# Patient Record
Sex: FEMALE | Race: BLACK OR AFRICAN AMERICAN | URBAN - METROPOLITAN AREA
[De-identification: names, ages, dates, MRNs, and addresses within clinical notes are randomized per-mention and may not be internally consistent; named-entity substitution may affect disease eponyms.]

---

## 2018-01-12 ENCOUNTER — TRANSFERRED RECORDS (OUTPATIENT)
Dept: HEALTH INFORMATION MANAGEMENT | Facility: CLINIC | Age: 48
End: 2018-01-12

## 2018-01-15 ENCOUNTER — MEDICAL CORRESPONDENCE (OUTPATIENT)
Dept: HEALTH INFORMATION MANAGEMENT | Facility: CLINIC | Age: 48
End: 2018-01-15

## 2018-01-25 NOTE — TELEPHONE ENCOUNTER
APPT INFO     Date /Time:  01/29/18 12:30pm   Reason for Appt: New MARLEE   Ref Provider/Clinic: [facility]   Internal Records NA   External Records JS Has Notes   Records Requested?: YES   Done?: YES     ACTION  01/25/18 9:10 AM   What did you do? Called   Who? [facility]   Phone/Fax Number:  Phone:  [phone]     Fax:   Outcome? Spoke with Piedad in Medical Records, She is sending lab results            01/25/18 11:27 AM   Records Received From:  [facility]     Received Lab results

## 2018-01-29 ENCOUNTER — OFFICE VISIT (OUTPATIENT)
Dept: OPHTHALMOLOGY | Facility: CLINIC | Age: 48
End: 2018-01-29
Attending: OPHTHALMOLOGY
Payer: MEDICAID

## 2018-01-29 ENCOUNTER — PRE VISIT (OUTPATIENT)
Dept: OPHTHALMOLOGY | Facility: CLINIC | Age: 48
End: 2018-01-29

## 2018-01-29 DIAGNOSIS — H50.34 INTERMITTENT EXOTROPIA, ALTERNATING: Primary | ICD-10-CM

## 2018-01-29 DIAGNOSIS — E07.9 THYROID EYE DISEASE: ICD-10-CM

## 2018-01-29 DIAGNOSIS — H57.89 THYROID EYE DISEASE: ICD-10-CM

## 2018-01-29 DIAGNOSIS — H50.21 HYPOTROPIA OF RIGHT EYE: ICD-10-CM

## 2018-01-29 PROCEDURE — 92285 EXTERNAL OCULAR PHOTOGRAPHY: CPT | Mod: ZF | Performed by: OPHTHALMOLOGY

## 2018-01-29 PROCEDURE — 92060 SENSORIMOTOR EXAMINATION: CPT | Mod: ZF | Performed by: OPHTHALMOLOGY

## 2018-01-29 PROCEDURE — G0463 HOSPITAL OUTPT CLINIC VISIT: HCPCS | Mod: 25,ZF | Performed by: TECHNICIAN/TECHNOLOGIST

## 2018-01-29 RX ORDER — METHIMAZOLE 5 MG/1
15 TABLET ORAL
COMMUNITY
Start: 2017-05-12

## 2018-01-29 ASSESSMENT — LAGOPHTHALMOS
OS_LAGOPHTHALMOS: 0
OD_LAGOPHTHALMOS: 0

## 2018-01-29 ASSESSMENT — CUP TO DISC RATIO
OS_RATIO: 0.5
OD_RATIO: 0.5

## 2018-01-29 ASSESSMENT — REFRACTION_WEARINGRX
OD_SPHERE: -2.50
OD_ADD: +1.75
OS_SPHERE: -3.00
OS_AXIS: 170
OD_AXIS: 015
OS_CYLINDER: +2.75
OD_CYLINDER: +1.75
SPECS_TYPE: BIFOCAL
OS_ADD: +1.75

## 2018-01-29 ASSESSMENT — VISUAL ACUITY
OS_CC+: -2
CORRECTION_TYPE: GLASSES
OD_CC+: -3
METHOD: SNELLEN - LINEAR
OD_CC: 20/20
OS_CC: 20/20

## 2018-01-29 ASSESSMENT — EXTERNAL EXAM - LEFT EYE: OS_EXAM: PROPTOSIS

## 2018-01-29 ASSESSMENT — TONOMETRY
IOP_METHOD: B/S LM ICARE
OS_IOP_MMHG: 16
OD_IOP_MMHG: 16

## 2018-01-29 ASSESSMENT — EXTERNAL EXAM - RIGHT EYE: OD_EXAM: PROPTOSIS

## 2018-01-29 NOTE — PROGRESS NOTES
Ophthalmology Thyroid Clinic New Patient (ITEDS)    Patient: Kellee Meredith MRN# [id number]   YOB: 1970 Age: 47 year old   Date of Visit: Jan 29, 2018    Chief Complaint: Kellee Meredith is a 47 year old year old female who presents for evaluation of thyroid eye disease. Diagnosed in 2015 with Graves disease based on MCCARTHY uptake of 77%. Initially started on methimazole but stopped in 2016 2/2 side effects. Restarted January 2018.     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 Baldwin  Studies: TSH <0.02 , T4 2.4, free T3 12.9 (5/11/17)  Therapy: recently restartedTapazole 15mg    Current thyroid status: high Past Medical History:   Diagnosis Date     Graves disease          Current Outpatient Prescriptions:      methimazole (TAPAZOLE) 5 MG tablet, Take 15 mg by mouth, Disp: , Rfl:    Most bothersome orbitopathy symptoms: appearance Most bothersome systemic symptoms: Proptosis Overall quality of life relating to disease:  bothered     Examination:  Base Eye Exam     Visual Acuity (Snellen - Linear)      Right Left   Dist cc 20/20 -3 20/20 -2       Correction:  Glasses      Tonometry (B/S LM icare, 1:01 PM)      Right Left   Pressure 16 16         Pupils      Pupils APD   Right PERRL None   Left PERRL None         Neuro/Psych     Oriented x3:  Yes    Mood/Affect:  Normal            Additional Tests     Color      Right Left   Ishihara 11/11 11/11         Stereo      Fly:  +    Circles:  8/9            Strabismus Exam        Method:  Alternate cover Distance Near Near +3.00DS Near Bifocals     Correction:  cc       X' 15                   0 - trace 0  X 4 0 0 0    R Tilt                         X 4 - trace  0  X(T) 4 0  - trace  X 4             RHypo(T) 2       L Tilt       0 0 0  X(T) 4 0 0 0            DVD:    RHypo(T) 1 DVD:           Nystagmus:  None NPC:  3 cm Dist Near    AHP:  None Fusional Convergence:  suppresses       Fusional Divergence:        Refraction     Wearing Rx      Sphere Cylinder Axis Add   Right -2.50 +1.75 015 +1.75   Left -3.00 +2.75 170 +1.75       Age:  1yr    Type:  Bifocal                                   Disease Grade (ITEDS)   V (optic neuropathy) I (inflammation) S (diplopia) S (restriction) A (appearance/exposure)   Scoring N Caruncular edema: 0  Chemosis: 0  Conj redness: 0  Lid redness: 0  Lid edema:1  Retrobulbar ache: 1  Diurnal variation: 0       Intermittent: 2      Head tilt: n >45: 0  30-45: 1  15-30: 2  <15: 3   Lid stare: y  Light sensitivity: n  Bulging eyes: y  Tearing: y  Ocular irritation: y    Mild 1 (0-3)   Total 0/1 2/10 2/3 0/3 2/3   Progress  initial initial initial initial initial     Laboratory and Imaging Results:    TSI:     CT Scan: None  Visual field testing: None    Assessment/Plan:    Kellee Meredith is a 47 year old year old female who presents with Graves disease and THYROID EYE DISEASE.  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  I personally obtained the chief complaint(s) and history of present illness.  I confirmed and edited as necessary the review of systems, past medical/surgical history, family history, social history, and examination findings as documented by others; and I examined the patient myself.  I personally reviewed the relevant tests, images, and reports as documented above.  I formulated and edited as necessary the assessment and plan and discussed the findings and management plan with the patient and family. - Beau Good MD

## 2018-01-29 NOTE — MR AVS SNAPSHOT
After Visit Summary   1/29/2018    Kellee Meredith    MRN: [id number]           Patient Information     Date Of Birth          1970        Visit Information        Provider Department      1/29/2018 12:30 PM Beau Good MD [facility] Eye General        Today's Diagnoses     Intermittent exotropia, alternating    -  1    Hypotropia of right eye        Thyroid eye disease           Follow-ups after your visit        Follow-up notes from your care team     Return in about 4 months (around 5/29/2018) for Vision, tension color, thyroid eye clinic - Mondays.      Your next 10 appointments already scheduled     Jan 29, 2018  3:30 PM CST   CT TEMPORAL ORBITAL SELLA W/O CONTRAST with URCT1   [facility], [place], Radiology ([facility])    [facility] [address] [phone]           Please bring any scans or X-rays taken at other hospitals, if similar tests were done. Also bring a list of your medicines, including vitamins, minerals and over-the-counter drugs. It is safest to leave personal items at home.  Be sure to tell your doctor:   If you have any allergies.   If there s any chance you are pregnant.   If you are breastfeeding.   If you have any special needs.  You do not need to do anything special to prepare.  Please wear loose clothing, such as a sweat suit or jogging clothes. Avoid snaps, zippers and other metal. We may ask you to undress and put on a hospital gown.            May 21, 2018  2:30 PM CDT   Return Pediatric Visit with Beau Good MD   [facility] Peds Eye General ([facility])    [address] [facility] [address]  [address] [address] [phone]              Future tests that were ordered for you today     Open Future Orders        Priority Expected Expires Ordered    Thyroid stimulating immunoglobulin Routine  4/29/2018 1/29/2018    CT Temporal Orbital Sella w/o Contrast  Routine  2019    Thyroid stimulating immunoglobulin Routine  2018            Who to contact     Please call your clinic at [phone] to:    Ask questions about your health    Make or cancel appointments    Discuss your medicines    Learn about your test results    Speak to your doctor   If you have compliments or concerns about an experience at your clinic, or if you wish to file a complaint, please contact [facility] Patient Relations at [phone] or email us at [email or web address]         Additional Information About Your Visit        [email or web address] Information     Neotropix is an electronic gateway that provides easy, online access to your medical records. With Neotropix, you can request a clinic appointment, read your test results, renew a prescription or communicate with your care team.     To sign up for Neotropix visit the website at [email or web address]   You will be asked to enter the access code listed below, as well as some personal information. Please follow the directions to create your username and password.     Your access code is: [email or web address]  Expires: 2018  1:57 PM     Your access code will  in 90 days. If you need help or a new code, please contact your [facility] or call [phone] for assistance.        Care EveryWhere ID     This is your Care EveryWhere ID. This could be used by other organizations to access your [facility] medical records  [id number]         Blood Pressure from Last 3 Encounters:   No data found for BP    Weight from Last 3 Encounters:   No data found for Wt              We Performed the Following     External Photos Thyroid Series     Sensorimotor        Primary Care Provider Office Phone # Fax #    James Covington [phone]       [address] [address]        Equal Access to Services     KEY LUX AH: Sivakumar velázquez  mitchell Ellis, warussellda terryadaha, qadanitata kageorgina li, mini akankshain hayaavirginia coppolatitus maruiceaubrie laelenivirginia lizbeth. So [facility] [phone].    ATENCIÓN: Si habla español, tiene a parikh disposición servicios gratuitos de asistencia lingüística. Llame al [phone].    We comply with applicable federal civil rights laws and [place] laws. We do not discriminate on the basis of race, color, national origin, age, disability, sex, sexual orientation, or gender identity.            Thank you!     Thank you for choosing [facility]  for your care. Our goal is always to provide you with excellent care. Hearing back from our patients is one way we can continue to improve our services. Please take a few minutes to complete the written survey that you may receive in the mail after your visit with us. Thank you!             Your Updated Medication List - Protect others around you: Learn how to safely use, store and throw away your medicines at www.disposemymeds.org.          This list is accurate as of 1/29/18  2:10 PM.  Always use your most recent med list.                   Brand Name Dispense Instructions for use Diagnosis    methimazole 5 MG tablet    TAPAZOLE     Take 15 mg by mouth

## 2018-01-29 NOTE — LETTER
1/29/2018      RE: Kellee Meredith  [address] [facility] [address]       Ophthalmology Thyroid Clinic New Patient (ITEDS)    Patient: Kellee Meredith MRN# [id number]   YOB: 1970 Age: 47 year old   Date of Visit: Jan 29, 2018    Chief Complaint: Kellee Meredith is a 47 year old year old female who presents for evaluation of thyroid eye disease. Diagnosed in 2015 with Graves disease based on MCCARTHY uptake of 77%. Initially started on methimazole but stopped in 2016 2/2 side effects. Restarted January 2018.     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 Baldwin  Studies: TSH <0.02 , T4 2.4, free T3 12.9 (5/11/17)  Therapy: recently restartedTapazole 15mg    Current thyroid status: high Past Medical History:   Diagnosis Date     Graves disease          Current Outpatient Prescriptions:      methimazole (TAPAZOLE) 5 MG tablet, Take 15 mg by mouth, Disp: , Rfl:    Most bothersome orbitopathy symptoms: appearance Most bothersome systemic symptoms: Proptosis Overall quality of life relating to disease:  bothered     Examination:  Base Eye Exam     Visual Acuity (Snellen - Linear)      Right Left   Dist cc 20/20 -3 20/20 -2       Correction:  Glasses      Tonometry (B/S LM icare, 1:01 PM)      Right Left   Pressure 16 16         Pupils      Pupils APD   Right PERRL None   Left PERRL None         Neuro/Psych     Oriented x3:  Yes    Mood/Affect:  Normal             Additional Tests     Color      Right Left   Kvein 11/11 11/11         Stereo     Fly:  +    Circles:  8/9            Strabismus Exam        Method:  Alternate cover Distance Near Near +3.00DS Near Bifocals     Correction:  cc       X' 15                   0 - trace 0  X 4 0 0 0    R Tilt                         X 4 - trace  0  X(T) 4 0  - trace  X 4             RHypo(T) 2       L Tilt       0 0 0  X(T) 4 0 0 0            DVD:    RHypo(T) 1 DVD:           Nystagmus:  None NPC:  3 cm Dist Near    AHP:  None Fusional Convergence:  suppresses       Fusional Divergence:        Refraction     Wearing Rx      Sphere Cylinder Axis Add   Right -2.50 +1.75 015 +1.75   Left -3.00 +2.75 170 +1.75       Age:  1yr    Type:  Bifocal                Disease Grade (ITEDS)   V (optic neuropathy) I (inflammation) S (diplopia) S (restriction) A (appearance/exposure)   Scoring N Caruncular edema: 0  Chemosis: 0  Conj redness: 0  Lid redness: 0  Lid edema:1  Retrobulbar ache: 1  Diurnal variation: 0       Intermittent: 2      Head tilt: n >45: 0  30-45: 1  15-30: 2  <15: 3   Lid stare: y  Light sensitivity: n  Bulging eyes: y  Tearing: y  Ocular irritation: y    Mild 1 (0-3)   Total 0/1 2/10 2/3 0/3 2/3   Progress  initial initial initial initial initial       Laboratory and Imaging Results:    TSI:   CT Scan: None  Visual field testing: None    Assessment/Plan:    Kellee Meredith is a 47 year old year old female who presents with Graves disease and THYROID EYE DISEASE.  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  I would like to thank you for sending your records and I have summarized them in the history of present illness.  My assessment and plan are below.  For further details, please see my attached clinic note.     Beau Good MD  Professor, Neuro-Ophthalmology  Department of Ophthalmology and Visual Neurosciences  [facility]

## 2018-01-29 NOTE — NURSING NOTE
Chief Complaint   Patient presents with     Thyroid Disease     dx with Graves disease in 2015, currently taking methimozole, VA seems good cc - has become more dependent on glasses, eyes appear to bulge,occaisonal eye pain, increased dark circles around eyes, some tearing noticed, no eye redness, +itchy eyes, HA for past two days left temple area, rarely noticed diplopia, no strab noticed     HPI    Informant(s):  patient    Affected eye(s):  Both   Symptoms:

## 2018-01-30 ENCOUNTER — HOSPITAL ENCOUNTER (OUTPATIENT)
Dept: CT IMAGING | Facility: CLINIC | Age: 48
Discharge: HOME | End: 2018-01-30
Attending: OPHTHALMOLOGY | Admitting: OPHTHALMOLOGY
Payer: MEDICAID

## 2018-01-30 DIAGNOSIS — E07.9 THYROID EYE DISEASE: ICD-10-CM

## 2018-01-30 DIAGNOSIS — H57.89 THYROID EYE DISEASE: ICD-10-CM

## 2018-01-30 DIAGNOSIS — H50.34 INTERMITTENT EXOTROPIA, ALTERNATING: ICD-10-CM

## 2018-01-30 DIAGNOSIS — H50.21 HYPOTROPIA OF RIGHT EYE: ICD-10-CM

## 2018-01-30 PROCEDURE — 84445 ASSAY OF TSI GLOBULIN: CPT | Performed by: OPHTHALMOLOGY

## 2018-01-30 PROCEDURE — 70480 CT ORBIT/EAR/FOSSA W/O DYE: CPT

## 2018-02-01 LAB — TSI SER-ACNC: 6.1 TSI INDEX

## 2018-05-21 ENCOUNTER — OFFICE VISIT (OUTPATIENT)
Dept: OPHTHALMOLOGY | Facility: CLINIC | Age: 48
End: 2018-05-21
Attending: OPHTHALMOLOGY

## 2018-05-21 DIAGNOSIS — E07.9 THYROID EYE DISEASE: ICD-10-CM

## 2018-05-21 DIAGNOSIS — H57.89 THYROID EYE DISEASE: ICD-10-CM

## 2018-05-21 DIAGNOSIS — H50.52 EXOPHORIA: Primary | ICD-10-CM

## 2018-05-21 PROCEDURE — 92060 SENSORIMOTOR EXAMINATION: CPT | Mod: ZF | Performed by: OPHTHALMOLOGY

## 2018-05-21 ASSESSMENT — EXTERNAL EXAM - LEFT EYE: OS_EXAM: PROPTOSIS

## 2018-05-21 ASSESSMENT — TONOMETRY
OD_IOP_MMHG: 22
OS_IOP_MMHG: 28
IOP_METHOD: ICARE - MM/KS
OS_IOP_MMHG: 13
OD_IOP_MMHG: 12

## 2018-05-21 ASSESSMENT — REFRACTION_WEARINGRX
OS_CYLINDER: +2.75
OD_SPHERE: -2.50
OD_ADD: +1.75
OS_ADD: +1.75
OD_AXIS: 015
OD_CYLINDER: +1.75
OS_AXIS: 170
SPECS_TYPE: BIFOCAL
OS_SPHERE: -3.00

## 2018-05-21 ASSESSMENT — SLIT LAMP EXAM - LIDS: COMMENTS: NORMAL

## 2018-05-21 ASSESSMENT — CONF VISUAL FIELD
OD_NORMAL: 1
METHOD: COUNTING FINGERS
OS_NORMAL: 1

## 2018-05-21 ASSESSMENT — CUP TO DISC RATIO
OS_RATIO: 0.5
OD_RATIO: 0.5

## 2018-05-21 ASSESSMENT — VISUAL ACUITY
OD_CC: 20/20
CORRECTION_TYPE: GLASSES
METHOD: SNELLEN - LINEAR
OS_CC: 20/20
OD_CC+: -

## 2018-05-21 ASSESSMENT — EXTERNAL EXAM - RIGHT EYE: OD_EXAM: PROPTOSIS

## 2018-05-21 NOTE — PROGRESS NOTES
Ophthalmology Thyroid Clinic Return Visit (ITEDS)    Patient: Kellee Meredith MRN# [id number]   YOB: 1970 Age: 47 year old   Date of Visit: May 21, 2018    Chief Complaint: Kellee Meredith is a 47 year old year old female who presents for follow up of thyroid eye disease.    Orbitopathy Thyroid General   Symptoms: Tearing, intermittent diplopia Symptoms: Fatigue Smoking: Never smoker   Progress: Both have improved Progress: Stable/improved Med changes: Methimazole increase   Therapy: Tears Therapy: Methimazole 20 mg (increased in Feb 2018) Quality of life: Good       Examination:    Base Eye Exam     Visual Acuity (Snellen - Linear)      Right Left   Dist cc 20/20 - 20/20       Correction:  Glasses      Tonometry (icare - MM/ks, 3:01 PM)      Right Left   Pressure 12 13         Tonometry #2 (icare - upgaze/TT/ks, 3:01 PM)      Right Left   Pressure 22 28         Pupils      Pupils APD   Right PERRL None   Left PERRL None         Visual Fields (Counting fingers)      Left Right   Result Full Full         Neuro/Psych     Oriented x3:  Yes    Mood/Affect:  Normal            Additional Tests     Color      Right Left   Ishihara 11/11 11/11         Stereo     Fly:  +    Animals:  3/3    Circles:  9/9      Double Ortiz Jose Carlos     Right:  Normal    Left:  Normal            Strabismus Exam        Method:  Alternate cover Distance Near Near +3.00DS Near Bifocals     Correction:  cc       X' 15-20                   0 -tr 0  X flick 0 -tr 0    R Tilt                         X flick 0  0  X 4 0  0  X flick                     L Tilt       0 0 0  X 4 0 0 0            DVD:      DVD:           Nystagmus:  None NPC:  To the nose Dist Near    AHP:  None Fusional Convergence:  35     Fusional Divergence:         No diplopia d/n, I do not see vertical strabismus.   Refraction     Wearing Rx      Sphere Cylinder Axis Add   Right -2.50 +1.75 015 +1.75   Left -3.00 +2.75 170 +1.75       Type:  Bifocal                 Disease Grade (ITEDS)    V (optic neuropathy) I (inflammation) S (diplopia) S (restriction) A (appearance/exposure)   Scoring N Caruncular edema: 0  Chemosis: 0  Conj redness: 0  Lid redness: 0  Lid edema:0  Retrobulbar ache: 1  Diurnal variation: 0       Intermittent: 2        Head tilt: n >45: 0  30-45: 1  15-30: 2  <15: 3 Lid stare: y  Light sensitivity: n  Bulging eyes: y  Tearing: y  Ocular irritation: y     Mild 1 (0-3)   Total 0/1 0/10 2/3 0/3 2/3   Progress  stable improved stable stable stable      Laboratory and Imaging Results:  No results found for: TSH]  No results found for: T4]    TSI: 6.1 1/30/2018    CT Scan:   Findings:  Mild bilateral proptosis.     Bilateral thickening of the orbital musculature with decreased central  density consistent with fatty infiltration. This is most pronounced in  the inferior rectus muscles.     Partial visualization of the superior pole of the right thyroid, which  appears homogeneous. Minimal mucosal thickening along the floor the  left maxillary sinus. Mastoid air cells are clear. Benign frontal  hyperostosis. No acute intracranial pathology.         Impression:   Thyroid associated orbitopathy including proptosis and bilateral  thickening and fatty infiltration of the orbital muscles.     I have personally reviewed the examination and initial interpretation  and I agree with the findings.      Assessment/Plan:    Kellee Meredith is a 47 year old year old female with thyroid eye disease here for follow up.     - overall, her linda exophthalmometry is worse both eyes  - her eyes are red today, which is new   - Maintain good follow up and continue thyroid medication as recommended by endocrinologist  - Continue artifical tears  - Start Selenium    Overall disease activity: Active    Return visit: 4-6 months or sooner if needed      Attending Physician Attestation:  Complete documentation of historical and exam elements from today's encounter can be found  in the full encounter summary report (not reduplicated in this progress note).  I personally obtained the chief complaint(s) and history of present illness.  I confirmed and edited as necessary the review of systems, past medical/surgical history, family history, social history, and examination findings as documented by others; and I examined the patient myself.  I personally reviewed the relevant tests, images, and reports as documented above.  I formulated and edited as necessary the assessment and plan and discussed the findings and management plan with the patient and family. - Beau Good MD

## 2018-05-21 NOTE — MR AVS SNAPSHOT
After Visit Summary   2018    Kellee Meredith    MRN: [id number]           Patient Information     Date Of Birth          1970        Visit Information        Provider Department      2018 2:30 PM Beau Good MD [facility] Peds Eye General        Today's Diagnoses     Exophoria    -  1    Thyroid eye disease           Follow-ups after your visit        Follow-up notes from your care team     Return in about 6 months (around 2018) for Vision, tension color, thyroid eye clinic - .      Your next 10 appointments already scheduled     2018  2:30 PM CST   RETURN THYROID EYE with Beau Good MD   [facility] Peds Eye General ([facility])    [address] [facility] [address]  [address] [address] [phone]              Who to contact     Please call your clinic at [phone] to:    Ask questions about your health    Make or cancel appointments    Discuss your medicines    Learn about your test results    Speak to your doctor            Additional Information About Your Visit        MyChart Information     Kaseya is an electronic gateway that provides easy, online access to your medical records. With Kaseya, you can request a clinic appointment, read your test results, renew a prescription or communicate with your care team.     To sign up for Kaseya visit the website at [email or web address]   You will be asked to enter the access code listed below, as well as some personal information. Please follow the directions to create your username and password.     Your access code is: [email or web address]  Expires: 2018  3:34 PM     Your access code will  in 90 days. If you need help or a new code, please contact your [facility] or call [phone] for assistance.        Care EveryWhere ID     This is your Care EveryWhere ID. This could be used by other organizations to access your [facility]  records  [id number]         Blood Pressure from Last 3 Encounters:   No data found for BP    Weight from Last 3 Encounters:   No data found for Wt              We Performed the Following     Sensorimotor        Primary Care Provider Office Phone # Fax #    James Covington [phone]       [facility] [place] [address] [facility] [address]        Equal Access to Services     [facility] MACI : Hadii aad ku hadasho Soomaali, waaxda luqadaha, qaybta kaalmada adeegyada, waxay idiin hayaan adeeg kharash la'aan . So [facility] [phone].    ATENCIÓN: Si habla español, tiene a parikh disposición servicios gratuitos de asistencia lingüística. Llame al [phone].    We comply with applicable federal civil rights laws and [place] laws. We do not discriminate on the basis of race, color, national origin, age, disability, sex, sexual orientation, or gender identity.            Thank you!     Thank you for choosing [facility] EYE GENERAL  for your care. Our goal is always to provide you with excellent care. Hearing back from our patients is one way we can continue to improve our services. Please take a few minutes to complete the written survey that you may receive in the mail after your visit with us. Thank you!             Your Updated Medication List - Protect others around you: Learn how to safely use, store and throw away your medicines at www.disposemymeds.org.          This list is accurate as of 5/21/18  3:39 PM.  Always use your most recent med list.                   Brand Name Dispense Instructions for use Diagnosis    methimazole 5 MG tablet    TAPAZOLE     Take 15 mg by mouth

## 2018-05-24 ENCOUNTER — HOSPITAL ENCOUNTER (EMERGENCY)
Facility: CLINIC | Age: 48
Discharge: HOME | End: 2018-05-25
Attending: EMERGENCY MEDICINE | Admitting: EMERGENCY MEDICINE

## 2018-05-24 DIAGNOSIS — R10.13 EPIGASTRIC PAIN: ICD-10-CM

## 2018-05-24 NOTE — ED AVS SNAPSHOT
[facility] Emergency Department    [address] [place] [address]    Phone:  [phone]    Fax:  [phone]                                       Kellee Meredith   MRN: [id number]    Department:  [facility] Emergency Department   Date of Visit:  5/24/2018           Patient Information     Date Of Birth          1970        Your diagnoses for this visit were:     Epigastric pain        You were seen by Joe Jones MD.      Follow-up Information     Follow up with James Covington NP. Call in 1 week.    Specialty:  Nurse Practitioner    Contact information:    [address] [id number] [phone]          Discharge Instructions       You have a small right renal cyst. Please follow up with your doctor on this     Discharge Instructions  Abdominal Pain    Abdominal pain can be caused by many things. Your evaluation today does not show the exact cause for your pain. Your doctor today has decided that it is unlikely your pain is due to a life threatening problem, or a problem requiring surgery or hospital admission. Sometimes those problems cannot be found right away, so it is very important that you follow up as directed.  Sometimes only the changes which occur over time allow the cause of your pain to be found.    Return to the Emergency Department for a recheck in 8-12 hours if your pain continues.  If your pain gets worse, changes in location, or feels different, return to the Emergency Department right away.    ADULTS:  Return to the Emergency Department right away if:      You get an oral temperature above 102oF or as directed by your doctor.    You have blood in your stools (bright red or black, tarry stools).    You keep throwing up or can t drink liquids.    You see blood when you throw up.    You can t have a bowel movement or you can t pass gas.    Your stomach gets bloated or bigger.    Your skin or the whites  of your eyes look yellow.    You faint.    You have bloody, frequent or painful urination.    You have new symptoms or anything that worries you.    CHILDREN:  Return to the Emergency Department right away if your child has any of the above-listed symptoms or the following:      Pushes your hand away or screams/cries when his/her belly is touched.    You notice your child is very fussy or weak.    Your child is very tired and is too tired to eat or drink.    Your child is dehydrated.  Signs of dehydration can be:  o Your infant has had no wet diapers in 4-5 hours.  o Your older child has not passed urine in 6-8 hours.  o Your infant or child starts to have dry mouth and lips, or no saliva or tears.    PREGNANT WOMEN:  Return to the Emergency Department right away if you have any of the above-listed symptoms or the following:      You have bleeding, leaking fluid or passing tissue from the vagina.    You have worse pain or cramping, or pain in your shoulder or back.    You have vomiting that will not stop.    You have painful or bloody urination.    You have a temperature of 100oF or more.    Your baby is not moving as much as usual.    You faint.    You get a bad headache with or without eye problems and abdominal pain.    You have a convulsion or seizure.    You have unusual discharge from your vagina and abdominal pain.    Abdominal pain is pretty common during pregnancy.  Your pain may or may not be related to your pregnancy. You should follow-up closely with your OB doctor so they can evaluate you and your baby.  Until you follow-up with your regular doctor, do the following:       Avoid sex and do not put anything in your vagina.    Drink clear fluids.    Only take medications approved by your doctor.    MORE INFORMATION:    Appendicitis:  A possible cause of abdominal pain in any person who still has their appendix is acute appendicitis. Appendicitis is often hard to diagnose.  Testing does not always rule out  "early appendicitis or other causes of abdominal pain. Close follow-up with your doctor and re-evaluations may be needed to figure out the reason for your abdominal pain.    Follow-up:  It is very important that you make an appointment with your clinic and go to the appointment.  If you do not follow-up with your primary doctor, it may result in missing an important development which could result in permanent injury or disability and/or lasting pain.  If there is any problem keeping your appointment, call your doctor or return to the Emergency Department.    Medications:  Take your medications as directed by your doctor today.  Before using over-the-counter medications, ask your doctor and make sure to take the medications as directed.  If you have any questions about medications, ask your doctor.    Diet:  Resume your normal diet as much as possible, but do not eat fried, fatty or spicy foods while you have pain.  Do not drink alcohol or have caffeine.  Do not smoke tobacco.    Probiotics: If you have been given an antibiotic, you may want to also take a probiotic pill or eat yogurt with live cultures. Probiotics have \"good bacteria\" to help your intestines stay healthy. Studies have shown that probiotics help prevent diarrhea and other intestine problems (including C. diff infection) when you take antibiotics. You can buy these without a prescription in the pharmacy section of the store.     If you were given a prescription for medicine here today, be sure to read all of the information (including the package insert) that comes with your prescription.  This will include important information about the medicine, its side effects, and any warnings that you need to know about.  The pharmacist who fills the prescription can provide more information and answer questions you may have about the medicine.  If you have questions or concerns that the pharmacist cannot address, please call or return to the Emergency " Department.         Opioid Medication Information    Pain medications are among the most commonly prescribed medicines, so we are including this information for all our patients. If you did not receive pain medication or get a prescription for pain medicine, you can ignore it.     You may have been given a prescription for an opioid (narcotic) pain medicine and/or have received a pain medicine while here in the Emergency Department. These medicines can make you drowsy or impaired. You must not drive, operate dangerous equipment, or engage in any other dangerous activities while taking these medications. If you drive while taking these medications, you could be arrested for DUI, or driving under the influence. Do not drink any alcohol while you are taking these medications.     Opioid pain medications can cause addiction. If you have a history of chemical dependency of any type, you are at a higher risk of becoming addicted to pain medications.  Only take these prescribed medications to treat your pain when all other options have been tried. Take it for as short a time and as few doses as possible. Store your pain pills in a secure place, as they are frequently stolen and provide a dangerous opportunity for children or visitors in your house to start abusing these powerful medications. We will not replace any lost or stolen medicine.  As soon as your pain is better, you should flush all your remaining medication.     Many prescription pain medications contain Tylenol  (acetaminophen), including Vicodin , Tylenol #3 , Norco , Lortab , and Percocet .  You should not take any extra pills of Tylenol  if you are using these prescription medications or you can get very sick.  Do not ever take more than 3000 mg of acetaminophen in any 24 hour period.    All opioids tend to cause constipation. Drink plenty of water and eat foods that have a lot of fiber, such as fruits, vegetables, prune juice, apple juice and high fiber  cereal.  Take a laxative if you don t move your bowels at least every other day. Miralax , Milk of Magnesia, Colace , or Senna  can be used to keep you regular.      Remember that you can always come back to the Emergency Department if you are not able to see your regular doctor in the amount of time listed above, if you get any new symptoms, or if there is anything that worries you.          Your next 10 appointments already scheduled     Nov 19, 2018  2:30 PM CST   RETURN THYROID EYE with Beau Good MD   [facility] ([facility])    [address] [facility] [address]  [address] [address] [phone]              24 Hour Appointment Hotline       To make an appointment at any [facility] ([facility]), call [phone] ([phone]). If you don't have a family doctor or clinic, we will help you find one. [facility] are conveniently located to serve the needs of you and your family.             Review of your medicines      START taking        Dose / Directions Last dose taken    ondansetron 4 MG ODT tab   Commonly known as:  ZOFRAN ODT   Dose:  4 mg   Quantity:  10 tablet        Take 1 tablet (4 mg) by mouth every 8 hours as needed for nausea   Refills:  0        ranitidine 150 MG tablet   Commonly known as:  ZANTAC   Dose:  150 mg   Quantity:  30 tablet        Take 1 tablet (150 mg) by mouth 2 times daily for 15 days   Refills:  0          Our records show that you are taking the medicines listed below. If these are incorrect, please call your family doctor or clinic.        Dose / Directions Last dose taken    methimazole 5 MG tablet   Commonly known as:  TAPAZOLE   Dose:  15 mg        Take 15 mg by mouth   Refills:  0                Prescriptions were sent or printed at these locations (2 Prescriptions)                   Other Prescriptions                Printed at Department/Unit printer (2 of 2)         ondansetron (ZOFRAN ODT) 4 MG ODT tab               ranitidine  (ZANTAC) 150 MG tablet                Procedures and tests performed during your visit     Abdomen US, limited (RUQ only)    CBC with platelets differential    Comprehensive metabolic panel    ISTAT HCG Quantitative Pregnancy Nursing POCT    ISTAT HCG Quantitative Pregnancy POCT    Lipase    UA with Microscopic      Orders Needing Specimen Collection     None      Pending Results     Date and Time Order Name Status Description    5/25/2018 0105 Abdomen US, limited (RUQ only) Preliminary             Pending Culture Results     No orders found for last 3 day(s).            Pending Results Instructions     If you had any lab results that were not finalized at the time of your Discharge, you can call the ED Lab Result RN at [phone]. You will be contacted by this team for any positive Lab results or changes in treatment. The nurses are available 7 days a week from 10A to 6:30P.  You can leave a message 24 hours per day and they will return your call.        Test Results From Your Hospital Stay        5/25/2018 12:44 AM      Component Results     Component Value Ref Range & Units Status    WBC 8.5 4.0 - 11.0 10e9/L Final    RBC Count 5.28 (H) 3.8 - 5.2 10e12/L Final    Hemoglobin 12.2 11.7 - 15.7 g/dL Final    Hematocrit 39.3 35.0 - 47.0 % Final    MCV 74 (L) 78 - 100 fl Final    MCH 23.1 (L) 26.5 - 33.0 pg Final    MCHC 31.0 (L) 31.5 - 36.5 g/dL Final    RDW 14.1 10.0 - 15.0 % Final    Platelet Count 193 150 - 450 10e9/L Final    Diff Method Automated Method  Final    % Neutrophils 35.3 % Final    % Lymphocytes 53.7 % Final    % Monocytes 9.3 % Final    % Eosinophils 1.4 % Final    % Basophils 0.1 % Final    % Immature Granulocytes 0.2 % Final    Nucleated RBCs 0 0 /100 Final    Absolute Neutrophil 3.0 1.6 - 8.3 10e9/L Final    Absolute Lymphocytes 4.6 0.8 - 5.3 10e9/L Final    Absolute Monocytes 0.8 0.0 - 1.3 10e9/L Final    Absolute Eosinophils 0.1 0.0 - 0.7 10e9/L Final    Absolute Basophils 0.0 0.0 - 0.2 10e9/L  Final    Abs Immature Granulocytes 0.0 0 - 0.4 10e9/L Final    Absolute Nucleated RBC 0.0  Final         5/25/2018  1:03 AM      Component Results     Component Value Ref Range & Units Status    Sodium 137 133 - 144 mmol/L Final    Potassium 4.4 3.4 - 5.3 mmol/L Final    Chloride 109 94 - 109 mmol/L Final    Carbon Dioxide 25 20 - 32 mmol/L Final    Anion Gap 3 3 - 14 mmol/L Final    Glucose 113 (H) 70 - 99 mg/dL Final    Urea Nitrogen 20 7 - 30 mg/dL Final    Creatinine 0.72 0.52 - 1.04 mg/dL Final    GFR Estimate 86 >60 mL/min/1.7m2 Final    Non  GFR Calc    GFR Estimate If Black >90 >60 mL/min/1.7m2 Final    African American GFR Calc    Calcium 8.4 (L) 8.5 - 10.1 mg/dL Final    Bilirubin Total <0.1 (L) 0.2 - 1.3 mg/dL Final    Albumin 3.2 (L) 3.4 - 5.0 g/dL Final    Protein Total 7.0 6.8 - 8.8 g/dL Final    Alkaline Phosphatase 118 40 - 150 U/L Final    ALT 36 0 - 50 U/L Final    AST 19 0 - 45 U/L Final         5/25/2018  1:03 AM      Component Results     Component Value Ref Range & Units Status    Lipase 95 73 - 393 U/L Final         5/25/2018  1:12 AM      Component Results     Component Value Ref Range & Units Status    Color Urine Yellow  Final    Appearance Urine Clear  Final    Glucose Urine Negative NEG^Negative mg/dL Final    Bilirubin Urine Negative NEG^Negative Final    Ketones Urine Negative NEG^Negative mg/dL Final    Specific Gravity Urine 1.020 1.003 - 1.035 Final    Blood Urine Negative NEG^Negative Final    pH Urine 5.0 5.0 - 7.0 pH Final    Protein Albumin Urine Negative NEG^Negative mg/dL Final    Urobilinogen mg/dL 0.0 0.0 - 2.0 mg/dL Final    Nitrite Urine Negative NEG^Negative Final    Leukocyte Esterase Urine Negative NEG^Negative Final    Source Midstream Urine  Final    WBC Urine <1 0 - 5 /HPF Final    RBC Urine 1 0 - 2 /HPF Final    Squamous Epithelial /HPF Urine <1 0 - 1 /HPF Final    Mucous Urine Present (A) NEG^Negative /LPF Final         5/25/2018 12:48 AM       Component Results     Component Value Ref Range & Units Status    HCG Quantitative Serum <5.0 <5.0 IU/L Final         5/25/2018  1:51 AM      Narrative     US ABDOMEN LIMITED  5/25/2018 1:39 AM      HISTORY: Right upper quadrant pain.    COMPARISON: None.    FINDINGS: The liver is normal in size and texture without focal mass.  There is no intra or extrahepatic biliary dilatation. The common  hepatic duct measures 0.4 cm. The gallbladder is normal in appearance  without gallstones. The pancreas body appears normal. The head and  tail are obscured by bowel gas. The right kidney measures 11.6 cm.  There is a 1.4 cm cyst in the upper pole of the kidney. The proximal  abdominal aorta and IVC appear normal.        Impression     IMPRESSION:  1. No gallstone or biliary dilatation.  2. Small right renal cyst.                Clinical Quality Measure: Blood Pressure Screening     Your blood pressure was checked while you were in the emergency department today. The last reading we obtained was  BP: 139/82 . Please read the guidelines below about what these numbers mean and what you should do about them.  If your systolic blood pressure (the top number) is less than 120 and your diastolic blood pressure (the bottom number) is less than 80, then your blood pressure is normal. There is nothing more that you need to do about it.  If your systolic blood pressure (the top number) is 120-139 or your diastolic blood pressure (the bottom number) is 80-89, your blood pressure may be higher than it should be. You should have your blood pressure rechecked within a year by a primary care provider.  If your systolic blood pressure (the top number) is 140 or greater or your diastolic blood pressure (the bottom number) is 90 or greater, you may have high blood pressure. High blood pressure is treatable, but if left untreated over time it can put you at risk for heart attack, stroke, or kidney failure. You should have your blood pressure  "rechecked by a primary care provider within the next 4 weeks.  If your provider in the emergency department today gave you specific instructions to follow-up with your doctor or provider even sooner than that, you should follow that instruction and not wait for up to 4 weeks for your follow-up visit.        Thank you for choosing [facility]       Thank you for choosing [facility] for your care. Our goal is always to provide you with excellent care. Hearing back from our patients is one way we can continue to improve our services. Please take a few minutes to complete the written survey that you may receive in the mail after you visit with us. Thank you!        [employer] Information     [employer] lets you send messages to your doctor, view your test results, renew your prescriptions, schedule appointments and more. To sign up, go to [email or web address] . Click on \"Log in\" on the left side of the screen, which will take you to the Welcome page. Then click on \"Sign up Now\" on the right side of the page.     You will be asked to enter the access code listed below, as well as some personal information. Please follow the directions to create your username and password.     Your access code is: [email or web address]  Expires: 2018  3:34 PM     Your access code will  in 90 days. If you need help or a new code, please call your [facility] clinic or [phone].        Care EveryWhere ID     This is your Care EveryWhere ID. This could be used by other organizations to access your [facility] medical records  [id number]        Equal Access to Services     KEY LUX : Hadii aad ku hadasho Sojessieali, waaxda luqadaha, qaybta kaalmada guillermo, mini wyatt . So [facility] [phone].    ATENCIÓN: Si habla español, tiene a parikh disposición servicios gratuitos de asistencia lingüística. Llame al [phone].    We comply with applicable federal civil rights laws and [place] laws. We do not discriminate on " the basis of race, color, national origin, age, disability, sex, sexual orientation, or gender identity.            After Visit Summary       This is your record. Keep this with you and show to your community pharmacist(s) and doctor(s) at your next visit.

## 2018-05-24 NOTE — ED AVS SNAPSHOT
[facility] Emergency Department    [address]    [facility] [address]    Phone:  [phone]    Fax:  [phone]                                       Kellee Meredith   MRN: [id number]    Department:  [facility] Emergency Department   Date of Visit:  5/24/2018           After Visit Summary Signature Page     I have received my discharge instructions, and my questions have been answered. I have discussed any challenges I see with this plan with the nurse or doctor.    ..........................................................................................................................................  Patient/Patient Representative Signature      ..........................................................................................................................................  Patient Representative Print Name and Relationship to Patient    ..................................................               ................................................  Date                                            Time    ..........................................................................................................................................  Reviewed by Signature/Title    ...................................................              ..............................................  Date                                                            Time

## 2018-05-25 ENCOUNTER — APPOINTMENT (OUTPATIENT)
Dept: ULTRASOUND IMAGING | Facility: CLINIC | Age: 48
End: 2018-05-25
Attending: EMERGENCY MEDICINE

## 2018-05-25 VITALS
HEART RATE: 78 BPM | TEMPERATURE: 97.8 F | OXYGEN SATURATION: 97 % | RESPIRATION RATE: 18 BRPM | DIASTOLIC BLOOD PRESSURE: 82 MMHG | SYSTOLIC BLOOD PRESSURE: 139 MMHG

## 2018-05-25 LAB
ALBUMIN SERPL-MCNC: 3.2 G/DL (ref 3.4–5)
ALBUMIN UR-MCNC: NEGATIVE MG/DL
ALP SERPL-CCNC: 118 U/L (ref 40–150)
ALT SERPL W P-5'-P-CCNC: 36 U/L (ref 0–50)
ANION GAP SERPL CALCULATED.3IONS-SCNC: 3 MMOL/L (ref 3–14)
APPEARANCE UR: CLEAR
AST SERPL W P-5'-P-CCNC: 19 U/L (ref 0–45)
B-HCG FREE SERPL-ACNC: <5 IU/L
BASOPHILS # BLD AUTO: 0 10E9/L (ref 0–0.2)
BASOPHILS NFR BLD AUTO: 0.1 %
BILIRUB SERPL-MCNC: <0.1 MG/DL (ref 0.2–1.3)
BILIRUB UR QL STRIP: NEGATIVE
BUN SERPL-MCNC: 20 MG/DL (ref 7–30)
CALCIUM SERPL-MCNC: 8.4 MG/DL (ref 8.5–10.1)
CHLORIDE SERPL-SCNC: 109 MMOL/L (ref 94–109)
CO2 SERPL-SCNC: 25 MMOL/L (ref 20–32)
COLOR UR AUTO: YELLOW
CREAT SERPL-MCNC: 0.72 MG/DL (ref 0.52–1.04)
DIFFERENTIAL METHOD BLD: ABNORMAL
EOSINOPHIL # BLD AUTO: 0.1 10E9/L (ref 0–0.7)
EOSINOPHIL NFR BLD AUTO: 1.4 %
ERYTHROCYTE [DISTWIDTH] IN BLOOD BY AUTOMATED COUNT: 14.1 % (ref 10–15)
GFR SERPL CREATININE-BSD FRML MDRD: 86 ML/MIN/1.7M2
GLUCOSE SERPL-MCNC: 113 MG/DL (ref 70–99)
GLUCOSE UR STRIP-MCNC: NEGATIVE MG/DL
HCT VFR BLD AUTO: 39.3 % (ref 35–47)
HGB BLD-MCNC: 12.2 G/DL (ref 11.7–15.7)
HGB UR QL STRIP: NEGATIVE
IMM GRANULOCYTES # BLD: 0 10E9/L (ref 0–0.4)
IMM GRANULOCYTES NFR BLD: 0.2 %
INTERPRETATION ECG - MUSE: NORMAL
KETONES UR STRIP-MCNC: NEGATIVE MG/DL
LEUKOCYTE ESTERASE UR QL STRIP: NEGATIVE
LIPASE SERPL-CCNC: 95 U/L (ref 73–393)
LYMPHOCYTES # BLD AUTO: 4.6 10E9/L (ref 0.8–5.3)
LYMPHOCYTES NFR BLD AUTO: 53.7 %
MCH RBC QN AUTO: 23.1 PG (ref 26.5–33)
MCHC RBC AUTO-ENTMCNC: 31 G/DL (ref 31.5–36.5)
MCV RBC AUTO: 74 FL (ref 78–100)
MONOCYTES # BLD AUTO: 0.8 10E9/L (ref 0–1.3)
MONOCYTES NFR BLD AUTO: 9.3 %
MUCOUS THREADS #/AREA URNS LPF: PRESENT /LPF
NEUTROPHILS # BLD AUTO: 3 10E9/L (ref 1.6–8.3)
NEUTROPHILS NFR BLD AUTO: 35.3 %
NITRATE UR QL: NEGATIVE
NRBC # BLD AUTO: 0 10*3/UL
NRBC BLD AUTO-RTO: 0 /100
PH UR STRIP: 5 PH (ref 5–7)
PLATELET # BLD AUTO: 193 10E9/L (ref 150–450)
POTASSIUM SERPL-SCNC: 4.4 MMOL/L (ref 3.4–5.3)
PROT SERPL-MCNC: 7 G/DL (ref 6.8–8.8)
RBC # BLD AUTO: 5.28 10E12/L (ref 3.8–5.2)
RBC #/AREA URNS AUTO: 1 /HPF (ref 0–2)
SODIUM SERPL-SCNC: 137 MMOL/L (ref 133–144)
SOURCE: ABNORMAL
SP GR UR STRIP: 1.02 (ref 1–1.03)
SQUAMOUS #/AREA URNS AUTO: <1 /HPF (ref 0–1)
UROBILINOGEN UR STRIP-MCNC: 0 MG/DL (ref 0–2)
WBC # BLD AUTO: 8.5 10E9/L (ref 4–11)
WBC #/AREA URNS AUTO: <1 /HPF (ref 0–5)

## 2018-05-25 PROCEDURE — 83690 ASSAY OF LIPASE: CPT | Performed by: EMERGENCY MEDICINE

## 2018-05-25 PROCEDURE — 96374 THER/PROPH/DIAG INJ IV PUSH: CPT

## 2018-05-25 PROCEDURE — 76705 ECHO EXAM OF ABDOMEN: CPT

## 2018-05-25 PROCEDURE — 81001 URINALYSIS AUTO W/SCOPE: CPT | Performed by: EMERGENCY MEDICINE

## 2018-05-25 PROCEDURE — 99285 EMERGENCY DEPT VISIT HI MDM: CPT | Mod: 25

## 2018-05-25 PROCEDURE — 96361 HYDRATE IV INFUSION ADD-ON: CPT

## 2018-05-25 PROCEDURE — 96375 TX/PRO/DX INJ NEW DRUG ADDON: CPT

## 2018-05-25 PROCEDURE — 80053 COMPREHEN METABOLIC PANEL: CPT | Performed by: EMERGENCY MEDICINE

## 2018-05-25 PROCEDURE — 84702 CHORIONIC GONADOTROPIN TEST: CPT

## 2018-05-25 PROCEDURE — 25000132 ZZH RX MED GY IP 250 OP 250 PS 637: Performed by: EMERGENCY MEDICINE

## 2018-05-25 PROCEDURE — 25000128 H RX IP 250 OP 636: Performed by: EMERGENCY MEDICINE

## 2018-05-25 PROCEDURE — 25000125 ZZHC RX 250: Performed by: EMERGENCY MEDICINE

## 2018-05-25 PROCEDURE — 85025 COMPLETE CBC W/AUTO DIFF WBC: CPT | Performed by: EMERGENCY MEDICINE

## 2018-05-25 RX ORDER — ONDANSETRON 4 MG/1
4 TABLET, ORALLY DISINTEGRATING ORAL EVERY 8 HOURS PRN
Qty: 10 TABLET | Refills: 0 | Status: SHIPPED | OUTPATIENT
Start: 2018-05-25 | End: 2018-05-28

## 2018-05-25 RX ORDER — SODIUM CHLORIDE, SODIUM LACTATE, POTASSIUM CHLORIDE, CALCIUM CHLORIDE 600; 310; 30; 20 MG/100ML; MG/100ML; MG/100ML; MG/100ML
1000 INJECTION, SOLUTION INTRAVENOUS CONTINUOUS
Status: DISCONTINUED | OUTPATIENT
Start: 2018-05-25 | End: 2018-05-25 | Stop reason: HOSPADM

## 2018-05-25 RX ORDER — ONDANSETRON 2 MG/ML
4 INJECTION INTRAMUSCULAR; INTRAVENOUS EVERY 30 MIN PRN
Status: DISCONTINUED | OUTPATIENT
Start: 2018-05-25 | End: 2018-05-25 | Stop reason: HOSPADM

## 2018-05-25 RX ADMIN — LIDOCAINE HYDROCHLORIDE 30 ML: 20 SOLUTION ORAL; TOPICAL at 00:43

## 2018-05-25 RX ADMIN — FAMOTIDINE 20 MG: 10 INJECTION, SOLUTION INTRAVENOUS at 00:44

## 2018-05-25 RX ADMIN — SODIUM CHLORIDE, POTASSIUM CHLORIDE, SODIUM LACTATE AND CALCIUM CHLORIDE 1000 ML: 600; 310; 30; 20 INJECTION, SOLUTION INTRAVENOUS at 00:44

## 2018-05-25 RX ADMIN — ONDANSETRON 4 MG: 2 INJECTION INTRAMUSCULAR; INTRAVENOUS at 00:44

## 2018-05-25 ASSESSMENT — ENCOUNTER SYMPTOMS
FEVER: 0
ABDOMINAL PAIN: 1
CHILLS: 0
DIARRHEA: 0
NAUSEA: 1
CONSTIPATION: 1
VOMITING: 1
SHORTNESS OF BREATH: 0

## 2018-05-25 NOTE — ED TRIAGE NOTES
47-year-old female presents to the ER with complaints of mid abd pain that radiates through to her back. Pt states this has been going on for the last three days. Also states she is feeling a little constipated. Denies problems with voiding. Has not checked to see if she had a fever or not.

## 2018-05-25 NOTE — DISCHARGE INSTRUCTIONS
You have a small right renal cyst. Please follow up with your doctor on this     Discharge Instructions  Abdominal Pain    Abdominal pain can be caused by many things. Your evaluation today does not show the exact cause for your pain. Your doctor today has decided that it is unlikely your pain is due to a life threatening problem, or a problem requiring surgery or hospital admission. Sometimes those problems cannot be found right away, so it is very important that you follow up as directed.  Sometimes only the changes which occur over time allow the cause of your pain to be found.    Return to the Emergency Department for a recheck in 8-12 hours if your pain continues.  If your pain gets worse, changes in location, or feels different, return to the Emergency Department right away.    ADULTS:  Return to the Emergency Department right away if:      You get an oral temperature above 102oF or as directed by your doctor.    You have blood in your stools (bright red or black, tarry stools).    You keep throwing up or can t drink liquids.    You see blood when you throw up.    You can t have a bowel movement or you can t pass gas.    Your stomach gets bloated or bigger.    Your skin or the whites of your eyes look yellow.    You faint.    You have bloody, frequent or painful urination.    You have new symptoms or anything that worries you.    CHILDREN:  Return to the Emergency Department right away if your child has any of the above-listed symptoms or the following:      Pushes your hand away or screams/cries when his/her belly is touched.    You notice your child is very fussy or weak.    Your child is very tired and is too tired to eat or drink.    Your child is dehydrated.  Signs of dehydration can be:  o Your infant has had no wet diapers in 4-5 hours.  o Your older child has not passed urine in 6-8 hours.  o Your infant or child starts to have dry mouth and lips, or no saliva or tears.    PREGNANT WOMEN:  Return to  the Emergency Department right away if you have any of the above-listed symptoms or the following:      You have bleeding, leaking fluid or passing tissue from the vagina.    You have worse pain or cramping, or pain in your shoulder or back.    You have vomiting that will not stop.    You have painful or bloody urination.    You have a temperature of 100oF or more.    Your baby is not moving as much as usual.    You faint.    You get a bad headache with or without eye problems and abdominal pain.    You have a convulsion or seizure.    You have unusual discharge from your vagina and abdominal pain.    Abdominal pain is pretty common during pregnancy.  Your pain may or may not be related to your pregnancy. You should follow-up closely with your OB doctor so they can evaluate you and your baby.  Until you follow-up with your regular doctor, do the following:       Avoid sex and do not put anything in your vagina.    Drink clear fluids.    Only take medications approved by your doctor.    MORE INFORMATION:    Appendicitis:  A possible cause of abdominal pain in any person who still has their appendix is acute appendicitis. Appendicitis is often hard to diagnose.  Testing does not always rule out early appendicitis or other causes of abdominal pain. Close follow-up with your doctor and re-evaluations may be needed to figure out the reason for your abdominal pain.    Follow-up:  It is very important that you make an appointment with your clinic and go to the appointment.  If you do not follow-up with your primary doctor, it may result in missing an important development which could result in permanent injury or disability and/or lasting pain.  If there is any problem keeping your appointment, call your doctor or return to the Emergency Department.    Medications:  Take your medications as directed by your doctor today.  Before using over-the-counter medications, ask your doctor and make sure to take the medications as  "directed.  If you have any questions about medications, ask your doctor.    Diet:  Resume your normal diet as much as possible, but do not eat fried, fatty or spicy foods while you have pain.  Do not drink alcohol or have caffeine.  Do not smoke tobacco.    Probiotics: If you have been given an antibiotic, you may want to also take a probiotic pill or eat yogurt with live cultures. Probiotics have \"good bacteria\" to help your intestines stay healthy. Studies have shown that probiotics help prevent diarrhea and other intestine problems (including C. diff infection) when you take antibiotics. You can buy these without a prescription in the pharmacy section of the store.     If you were given a prescription for medicine here today, be sure to read all of the information (including the package insert) that comes with your prescription.  This will include important information about the medicine, its side effects, and any warnings that you need to know about.  The pharmacist who fills the prescription can provide more information and answer questions you may have about the medicine.  If you have questions or concerns that the pharmacist cannot address, please call or return to the Emergency Department.         Opioid Medication Information    Pain medications are among the most commonly prescribed medicines, so we are including this information for all our patients. If you did not receive pain medication or get a prescription for pain medicine, you can ignore it.     You may have been given a prescription for an opioid (narcotic) pain medicine and/or have received a pain medicine while here in the Emergency Department. These medicines can make you drowsy or impaired. You must not drive, operate dangerous equipment, or engage in any other dangerous activities while taking these medications. If you drive while taking these medications, you could be arrested for DUI, or driving under the influence. Do not drink any alcohol " while you are taking these medications.     Opioid pain medications can cause addiction. If you have a history of chemical dependency of any type, you are at a higher risk of becoming addicted to pain medications.  Only take these prescribed medications to treat your pain when all other options have been tried. Take it for as short a time and as few doses as possible. Store your pain pills in a secure place, as they are frequently stolen and provide a dangerous opportunity for children or visitors in your house to start abusing these powerful medications. We will not replace any lost or stolen medicine.  As soon as your pain is better, you should flush all your remaining medication.     Many prescription pain medications contain Tylenol  (acetaminophen), including Vicodin , Tylenol #3 , Norco , Lortab , and Percocet .  You should not take any extra pills of Tylenol  if you are using these prescription medications or you can get very sick.  Do not ever take more than 3000 mg of acetaminophen in any 24 hour period.    All opioids tend to cause constipation. Drink plenty of water and eat foods that have a lot of fiber, such as fruits, vegetables, prune juice, apple juice and high fiber cereal.  Take a laxative if you don t move your bowels at least every other day. Miralax , Milk of Magnesia, Colace , or Senna  can be used to keep you regular.      Remember that you can always come back to the Emergency Department if you are not able to see your regular doctor in the amount of time listed above, if you get any new symptoms, or if there is anything that worries you.

## 2018-05-25 NOTE — ED PROVIDER NOTES
History     Chief Complaint:  Abdominal Pain    HPI   Kellee Meredith is a 47-year-old female who presents to the emergency department for evaluation of intermittent epigastric abdominal pain that has been ongoing for the last 3 days.  The patient states that the pain goes through to her back.  She notes that she has had associated nausea and vomiting with this.  The patient states that the pain has been alleviated for a short period of time after a back massage.  The patient also notes that she is having a bowel movement every second day and had a hard stool earlier tonight.  Patient has had no diarrhea or fever.  She denies any lower abdominal pain.  The patient reports that she had pain like this in the past after being constipated.      Allergies:  No known drug allergies.      Medications:    Methimazole     Past Medical History:    Graves disease     Past Surgical History:    History reviewed. No pertinent past surgical history.     Family History:    History reviewed. No pertinent family history.     Social History:  Marital Status:    Presents to the ED with    Tobacco Use: Never   Alcohol Use: No   PCP: James Covington      Review of Systems   Constitutional: Negative for chills and fever.   Respiratory: Negative for shortness of breath.    Cardiovascular: Negative for chest pain.   Gastrointestinal: Positive for abdominal pain, constipation, nausea and vomiting. Negative for diarrhea.   All other systems reviewed and are negative.    Physical Exam   First Vitals:  BP: 148/70  Pulse: 78  Heart Rate: 78  Temp: 97.8  F (36.6  C)  Resp: 18  SpO2: 100 %    Physical Exam  Constitutional:  Oriented to person, place, and time.  Well-appearing.  HENT:   Head:    Normocephalic.   Mouth/Throat:   Oropharynx is clear and moist.   Eyes:    EOM are normal. Pupils are equal, round, and reactive to light.   Neck:    Neck supple.   Cardiovascular:  Normal rate, regular rhythm and normal heart sounds.       Exam reveals no gallop and no friction rub.       No murmur heard.  Pulmonary/Chest:  Effort normal and breath sounds normal.      No respiratory distress. No wheezes. No rales.      No reproducible chest wall pain.  Abdominal:   Soft. No distension. No tenderness.      Epigastric and right upper quadrant tenderness.      Negative Singh's sign.      No rebound and no guarding.   Musculoskeletal:  Normal range of motion.   Neurological:   Alert and oriented to person, place, and time.           Moves all 4 extremities spontaneously    Skin:    No rash noted. No pallor.      Emergency Department Course   ECG:  @ 0025  Indication: Epigastric abdominal pain   Vent. Rate 78 bpm. CO interval 136 ms. QRS duration 94 ms. QT/QTc 382/435 ms. P-R-T axis 65 59 55.   Normal sinus rhythm with sinus arrhythmia. Nonspecific ST and T wave abnormality.   Read @ 0027 by Dr. Jones.     Imaging:  US Abdomen RUQ  1. No gallstone or biliary dilatation.  2. Small right renal cyst.    Radiographic findings were communicated with the patient who voiced understanding of the findings.    Laboratory:  Blood:  CBC:   WBC 8.5, HGB 12.2,   CMP:  Glc 113, Ca 8.4, total bilirubin <0.1, albumin 3.2, otherwise WNL (Creatinine 0.72)   Lipase: 95    ISTAT hCG Quant POCT: < 5.0    Urine:  UA: Clear yellow urine, mucous present, otherwise WNL     Interventions:  (0043) GI Cocktail, 30 mL, PO    (0044) Lactated Ringer's Bolus, 1000 mL, IV   (0044) Zofran, 4 mg, IV injection   (0044) Pepcid, 20 mg, IV       Emergency Department Course:  Nursing notes and vitals reviewed.    (0016) I entered the room with my scribe, obtained the history, and performed an exam of the patient as documented above.    EKG was done, interpretation as above.     A peripheral IV was established. Blood was drawn from the patient. This was sent for laboratory testing, findings above.      Urine sample was obtained and sent for laboratory analysis, findings above.      The patient was sent for a right upper quadrant ultrasound while in the emergency department, findings above.      I personally reviewed the laboratory and ultrasound results with the patient and answered all related questions prior to discharge.  I discussed the patient's renal cyst with her and the importance of following up with her primary care doctor.    Findings and plan explained to the patient. Patient discharged home with instructions regarding supportive care, medications, and reasons to return. The importance of close follow-up was reviewed. The patient was prescribed Zofran and Zantac.      Impression & Plan    Medical Decision Making:  Kellee Meredith is a 47-year-old female who came in complaining of epigastric and right upper quadrant pain.  Differential includes biliary colic, pancreatitis, ACS equivalent, small bowel obstruction, ectopic pregnancy, or other causes.  Workup thus far is otherwise nonspecific.  After GI cocktail, the patient is currently pain-free.  At this time I am suspicious of gastritis versus peptic ulcer disease, although this remains to be seen.  I would doubt surgical process and/or need for CT imaging.  She will be discharged home with a course of Zantac and Zofran.  She was told to follow-up with her primary care provider and return to the emergency department for any worsening abdominal pain, fever, vomiting.  Patient was also notified of small right renal cyst noted on right upper quadrant ultrasound.  She was told to follow-up with PCP about this.      Diagnosis:    ICD-10-CM   1. Epigastric pain R10.13     Disposition:  discharged to home    Discharge Medications:   Details   ondansetron (ZOFRAN ODT) 4 MG ODT tab Take 1 tablet (4 mg) by mouth every 8 hours as needed for nausea, Disp-10 tablet, R-0, Local Print      ranitidine (ZANTAC) 150 MG tablet Take 1 tablet (150 mg) by mouth 2 times daily for 15 days, Disp-30 tablet, R-0, Local Print           [place]  [facility] EMERGENCY DEPARTMENT      Scribe disclosure:  I, Ganesh Rivera, am serving as a scribe on 5/25/2018 at 12:16 AM to personally document services performed by Joe Jones MD, based on my observations and the provider's statements to me.                 Joe Jones MD  05/25/18 0442